# Patient Record
Sex: FEMALE | Race: WHITE | Employment: FULL TIME | URBAN - METROPOLITAN AREA
[De-identification: names, ages, dates, MRNs, and addresses within clinical notes are randomized per-mention and may not be internally consistent; named-entity substitution may affect disease eponyms.]

---

## 2018-12-15 ENCOUNTER — HOSPITAL ENCOUNTER (OUTPATIENT)
Age: 63
Discharge: HOME | End: 2018-12-15
Attending: FAMILY MEDICINE
Payer: COMMERCIAL

## 2018-12-15 VITALS
HEART RATE: 71 BPM | WEIGHT: 124 LBS | DIASTOLIC BLOOD PRESSURE: 76 MMHG | HEIGHT: 61.5 IN | TEMPERATURE: 98 F | SYSTOLIC BLOOD PRESSURE: 142 MMHG | RESPIRATION RATE: 18 BRPM | BODY MASS INDEX: 23.11 KG/M2 | OXYGEN SATURATION: 98 %

## 2018-12-15 DIAGNOSIS — H65.01 NON-RECURRENT ACUTE SEROUS OTITIS MEDIA OF RIGHT EAR: Primary | ICD-10-CM

## 2018-12-15 PROCEDURE — 99202 OFFICE O/P NEW SF 15 MIN: CPT

## 2018-12-15 RX ORDER — ROSUVASTATIN CALCIUM 10 MG/1
10 TABLET, COATED ORAL NIGHTLY
COMMUNITY

## 2018-12-15 NOTE — ED INITIAL ASSESSMENT (HPI)
Pt presents to clinic c/o right ear pressure x 4 days. She reports the ear feels like it is clogged, feels painful, with pressure. She reports feeling pressure on right side of face. She states she has used a nasal spray for cleaning sinus.  She reports sor

## 2018-12-15 NOTE — ED PROVIDER NOTES
Patient Seen in: [address]    History   Patient presents with:  Ear Problem Pain (neurosensory)  Sinus Problem    Stated Complaint: Sinus Infection, Ear infection    HPI    42-year-old female past medical history of hyp Mouth/Throat: Uvula is midline and mucous membranes are normal. Posterior oropharyngeal erythema present. No oropharyngeal exudate, posterior oropharyngeal edema or tonsillar abscesses.    Small amount of serous fluid noted bilaterally   Eyes: Conjunctiva

## 2018-12-15 NOTE — ED NOTES
Pt discharged home in stable condition. Reviewed otc meds and avs. Follow up as indicated. Pt verbalized understanding and agreed.

## 2024-04-22 ENCOUNTER — HOSPITAL ENCOUNTER (OUTPATIENT)
Age: 69
Discharge: HOME | End: 2024-04-22
Payer: COMMERCIAL

## 2024-04-22 VITALS
TEMPERATURE: 98 F | DIASTOLIC BLOOD PRESSURE: 88 MMHG | HEART RATE: 107 BPM | RESPIRATION RATE: 20 BRPM | OXYGEN SATURATION: 100 % | SYSTOLIC BLOOD PRESSURE: 113 MMHG

## 2024-04-22 DIAGNOSIS — H10.31 ACUTE CONJUNCTIVITIS OF RIGHT EYE, UNSPECIFIED ACUTE CONJUNCTIVITIS TYPE: Primary | ICD-10-CM

## 2024-04-22 DIAGNOSIS — H57.11 ACUTE PAIN IN RIGHT EYE: ICD-10-CM

## 2024-04-22 PROBLEM — M25.551 PAIN OF RIGHT HIP JOINT: Status: ACTIVE | Noted: 2018-08-29

## 2024-04-22 PROBLEM — I25.10 CORONARY ARTERY CALCIFICATION SEEN ON CT SCAN: Status: ACTIVE | Noted: 2020-09-03

## 2024-04-22 PROBLEM — M43.6 STIFF NECK: Status: ACTIVE | Noted: 2018-08-29

## 2024-04-22 PROBLEM — M99.09 SOMATIC DYSFUNCTION: Status: ACTIVE | Noted: 2018-08-29

## 2024-04-22 PROBLEM — C50.111 CANCER OF CENTRAL PORTION OF RIGHT BREAST (HCC): Status: ACTIVE | Noted: 2018-11-18

## 2024-04-22 PROBLEM — M89.9 DISORDER OF BONE: Status: ACTIVE | Noted: 2019-01-10

## 2024-04-22 PROBLEM — R06.09 DOE (DYSPNEA ON EXERTION): Status: ACTIVE | Noted: 2020-09-03

## 2024-04-22 PROBLEM — M81.8 OTHER OSTEOPOROSIS WITHOUT CURRENT PATHOLOGICAL FRACTURE: Status: ACTIVE | Noted: 2023-02-23

## 2024-04-22 PROBLEM — E78.5 DYSLIPIDEMIA: Status: ACTIVE | Noted: 2024-04-22

## 2024-04-22 PROCEDURE — 99204 OFFICE O/P NEW MOD 45 MIN: CPT | Performed by: EMERGENCY MEDICINE

## 2024-04-22 RX ORDER — TETRACAINE HYDROCHLORIDE 5 MG/ML
1 SOLUTION OPHTHALMIC ONCE
Status: COMPLETED | OUTPATIENT
Start: 2024-04-22 | End: 2024-04-22

## 2024-04-22 RX ORDER — OFLOXACIN 3 MG/ML
SOLUTION/ DROPS OPHTHALMIC
Qty: 5 ML | Refills: 0 | Status: SHIPPED | OUTPATIENT
Start: 2024-04-22

## 2024-04-22 NOTE — ED PROVIDER NOTES
Patient Seen in: [facility]      History     Chief Complaint   Patient presents with    Eye Problem     Stated Complaint: eye issue    Subjective:   HPI    Kina Jones is a 69 year old female here for right eye pain, swelling, and photophobia. Eye is watering. No dizziness, or syncope. Zometa infusing 04/17/2024 for osteoporosis, but read that side effects do not involve the eye(s). No dizziness, diplopia, vision loss, or syncope.     Objective:   Past Medical History:    Breast cancer (HCC)              Past Surgical History:   Procedure Laterality Date    Lumpectomy right      2 weeks ago                Social History     Socioeconomic History    Marital status:    Tobacco Use    Smoking status: Never    Smokeless tobacco: Never     Social Determinants of Health      Received from [facility]    Social Connections    Received from [facility]    Housing Stability              Review of Systems    Positive for stated complaint: eye issue  Other systems are as noted in HPI.  Constitutional and vital signs reviewed.      All other systems reviewed and negative except as noted above.    Physical Exam     ED Triage Vitals [04/22/24 0821]   /88   Pulse 107   Resp 20   Temp 97.6 °F (36.4 °C)   Temp src Temporal   SpO2 100 %   O2 Device None (Room air)       Current:/88   Pulse 107   Temp 97.6 °F (36.4 °C) (Temporal)   Resp 20   SpO2 100%         Physical Exam  Vitals and nursing note reviewed.   Constitutional:       Appearance: Normal appearance.   HENT:      Head: Normocephalic.   Eyes:      General: Lids are normal.      Intraocular pressure: Left eye pressure is 12 mmHg.      Extraocular Movements: Extraocular movements intact.      Conjunctiva/sclera:      Right eye: Right conjunctiva is injected. Exudate (mild) present. No chemosis or hemorrhage.     Pupils: Pupils are equal, round, and reactive to light.   Musculoskeletal:          General: Normal range of motion.   Skin:     Capillary Refill: Capillary refill takes less than 2 seconds.      Findings: No erythema or rash.   Neurological:      General: No focal deficit present.      Mental Status: She is alert and oriented to person, place, and time.   Psychiatric:         Mood and Affect: Mood normal.         Behavior: Behavior normal.         Thought Content: Thought content normal.         Judgment: Judgment normal.               ED Course   Labs Reviewed - No data to display                   MDM           Medical Decision Making  Treat for bacterial conjunctivitis.     No Swelling or  erythema across tissues surrounding the orbit No limitation of EOM, vision loss, diplopia. There is Not a cellulitis. No chalazion, hordeolum,  or mass. Sx not consistent with retinal detachment, or acute angled glaucoma; eye pressure 12.   Reinforced conservative measures.  Use cool compress.  No distress. Neuro intact without focal deficit.  Cleared for home.    I Updated patient  on all findings, who verbalized understanding and agreement with the plan. I explained to the patient that emergent conditions may arise and to go to the ER for new, worsening or any persistent conditions. Pcp or ophthalmology follow up.  All questions answered. No acute distress, and cleared for dc home.      Problems Addressed:  Acute conjunctivitis of right eye, unspecified acute conjunctivitis type: acute illness or injury  Acute pain in right eye: acute illness or injury    Amount and/or Complexity of Data Reviewed  External Data Reviewed: notes.  ECG/medicine tests: ordered and independent interpretation performed. Decision-making details documented in ED Course.     Details: Tetracaine and fluorescein verified with nurse.    Risk  OTC drugs.  Prescription drug management.        Disposition and Plan     Clinical Impression:  1. Acute conjunctivitis of right eye, unspecified acute conjunctivitis type    2. Acute pain in  right eye         Disposition:  Discharge  4/22/2024  8:51 am    Follow-up:  Faraz Shen MD  [address] [place], [place] [id number] [phone]    Call in 3 days            Medications Prescribed:  Discharge Medication List as of 4/22/2024  8:57 AM        START taking these medications    Details   ofloxacin 0.3 % Ophthalmic Solution Wash your hands.  Pull down the lower lid of your affected eye and apply 1 drop 4 times a day for the next 7 days.  Do not touch the tip of the applicator directly on your eye.  Wash your hands after putting the drops in., Normal, Disp-5 mL, R-0

## 2024-04-22 NOTE — DISCHARGE INSTRUCTIONS
Today do the drops ever 2 hours then tomorrow do one drop 4 times a day. Do not wake yourself to apply the drops.   Tylenol for pain.

## 2024-04-22 NOTE — ED INITIAL ASSESSMENT (HPI)
Pt with right eye redness, tenderness and crusting since Saturday; congestion last wk; denies fever